# Patient Record
(demographics unavailable — no encounter records)

---

## 2024-10-22 NOTE — HISTORY OF PRESENT ILLNESS
[FreeTextEntry1] : This 4-year-old was seen for -a follow-up visit. She was receiving fluticasone 44 2 puffs twice daily with a spacer.  Mother felt that she had behavior problems with montelukast which was discontinued.  She had had no further urticaria.  She washes her hands frequently and develops a rash over the dorsum of her hands.  She was receiving cetirizine routinely because of nasal congestion.  She was receiving 1 puff of fluticasone nasal spray once daily.  Mother feels that she develops cough and shortness of breath with activity only on cold days.  She had had a sick visit for cough and congestion July 2024.  Medications were not prescribed.  Shortly after this she was COVID-positive.  She drinks limited amounts of milk.  25-hydroxy vitamin D level had not been checked.  She was influenza positive and had a sick visit.  Symptoms resolved with administration of albuterol. She had been nasally congested for 2 days at the time of this visit. Sleep: She does not snore at night.   Her atopic dermatitis had improved and they were not using hydroxyzine.    She had a sick visit March 2024 when other family members were ill.  Symptoms resolved spontaneously.  She drinks limited amounts of milk.  She does not cough at night.  She is usually not nasally congested.  Atopic dermatitis flares up over her buttocks. This is improved.  Parents noticed that when she is in a swimming pool this tends to flareup. Mother uses Eucerin as Vaseline makes the rash worse. She had a sick visit when she developed a rash over the dorsum of her hands.  She develops urticaria when she is out in the cold.  If she wears gloves this does not happen.     July 2022 she was COVID-positive.  She was short of breath with fever, coughing and vomiting.  Since then she would be sick every 2 to 3 months.  She is most symptomatic fall and winter.  She has a history of coughing at night once a week and coughing in the mornings once a week.  She has a H/O coughing and being short of breath with activity.  She has a stuffy nose spring and fall.  She was diagnosed to have benign familial epilepsy at 7 months of age.  She had genetic testing.  She developed seizures initially in June 2020.  She had grand mal seizures.  She also has staring spells.  She was on Keppra for a while but was weaned off May 2023.  She follows up at [place].  Hospitalizations: With seizures in July 2020.  Emergency room visits: Prior to the hospitalization.  She was also seen in June 2020 She was seen with a laceration in July 2023 Surgery: Never  She has  a H/O scratching her buttocks frequently  She used to receive speech therapy but does not receive therapy at present.  She had allergy testing over the past several months with an allergist and tested negative     Her bowel movements are normal.  She was diagnosed to have pneumonia clinically July 2023 and treated with antibiotics and nebulized albuterol.  She was diagnosed to have bronchitis September 2023.

## 2024-10-22 NOTE — ASSESSMENT
[FreeTextEntry1] : Impression: Mild persistent bronchial asthma, nonallergic rhinitis, seizure disorder, atopic dermatitis, possible vitamin D insufficiency, cold urticaria  Mild persistent bronchial asthma: Flovent 44 was prescribed, 2 puffs twice daily with a spacer and mask. Montelukast was discontinuedl, 4 mg daily.Stressed the importance of administering albuterol prior to activity and every 4 hours as needed. Stressed the importance of using the action plan at onset of respiratory symptoms.  Once it warms up in the spring, perhaps albuterol prior to activity can be discontinued on the school form. Possible vitamin D insufficiency: 25-hydroxy vitamin D level is being checked. Cold urticaria: Suggested administering cetirizine as needed.  She does well if she uses gloves before going outdoors. Nonallergic rhinitis: Cetirizine is to be administered as needed.Fluticasone was prescribed 2 puffs each nostril in the morning daily.  Atopic dermatitis: Suggested avoiding tub baths. Suggested using Vaseline liberally.Suggested using Cetaphil cleanser for her hands.   Hydroxyzine was prescribed to be administered at bedtime as needed.Steroid ointment is to be used twice daily sparingly as needed.   Over 50% of time was spent in counseling.  I asked mother to bring her back for a follow-up visit in 4 month's time.  Dictation generated through [facility]. Note not proofed and edited.

## 2024-10-22 NOTE — REVIEW OF SYSTEMS
[Frequent URIs] : no frequent upper respiratory infections [Snoring] : no snoring [Apnea] : no apnea [Restlessness] : no restlessness [Daytime Sleepiness] : no daytime sleepiness [Daytime Hyperactivity] : no daytime hyperactivity [Voice Changes] : no voice changes [Frequent Croup] : no frequent croup [Chronic Hoarseness] : no chronic hoarseness [Rhinorrhea] : rhinorrhea [Nasal Congestion] : nasal congestion [Sinus Problems] : no sinus problems [Postnasl Drip] : no postnasal drip [Epistaxis] : no epistaxis [Recurrent Ear Infections] : no recurrent ear infections [Recurrent Sinus Infections] : no recurrent sinus infections [Recurrent Throat Infections] : no recurrent throat infections [Tachypnea] : not tachypneic [Wheezing] : no wheezing [Shortness of Breath] : no shortness of breath [Bronchitis] : no bronchitis [Pneumonia] : no pneumonia [Hemoptysis] : no hemoptysis [Sputum] : no sputum [Chronically Infected with ___] : no chronic infections [Urgency] : no feelings of urinary urgency [Dysuria] : no dysuria [Muscle Weakness] : no muscle weakness [Brain Hemorrhage] : no brain hemorrhage [Developmental Delay] : no developmental delay [Head Injury] : no head injury [Birth Marks] : no birth marks [Skin Infections] : no skin infections [Urticaria] : urticaria [Laryngeal Edema] : no laryngeal edema [Allergy Shiners] : no allergy shiners [Immunocompromised] : not immunocompromised [Angioedema] : no angioedema [Sleep Disturbances] : ~T no sleep disturbances [Hyperactive] : no hyperactive behavior

## 2024-10-22 NOTE — CONSULT LETTER
[FreeTextEntry3] : Mulugeta Newton MD Pediatric Pulmonology and Sleep Medicine Director [facility] , Pediatric [facility],  of Pediatrics, [facility], [address] [place] [id number] (P)[phone] (P) [phone] (F) [phone]